# Patient Record
Sex: MALE | Race: WHITE
[De-identification: names, ages, dates, MRNs, and addresses within clinical notes are randomized per-mention and may not be internally consistent; named-entity substitution may affect disease eponyms.]

---

## 2022-02-18 ENCOUNTER — HOSPITAL ENCOUNTER (INPATIENT)
Dept: HOSPITAL 95 - ER | Age: 57
LOS: 2 days | Discharge: HOME | DRG: 247 | End: 2022-02-20
Attending: INTERNAL MEDICINE | Admitting: INTERNAL MEDICINE
Payer: COMMERCIAL

## 2022-02-18 VITALS — WEIGHT: 205.47 LBS | BODY MASS INDEX: 31.14 KG/M2 | HEIGHT: 68 IN

## 2022-02-18 DIAGNOSIS — E78.5: ICD-10-CM

## 2022-02-18 DIAGNOSIS — I35.0: ICD-10-CM

## 2022-02-18 DIAGNOSIS — Z88.0: ICD-10-CM

## 2022-02-18 DIAGNOSIS — E11.9: ICD-10-CM

## 2022-02-18 DIAGNOSIS — I25.2: ICD-10-CM

## 2022-02-18 DIAGNOSIS — I25.10: ICD-10-CM

## 2022-02-18 DIAGNOSIS — Z88.6: ICD-10-CM

## 2022-02-18 DIAGNOSIS — Z53.29: ICD-10-CM

## 2022-02-18 DIAGNOSIS — L40.59: ICD-10-CM

## 2022-02-18 DIAGNOSIS — I16.1: ICD-10-CM

## 2022-02-18 DIAGNOSIS — J44.9: ICD-10-CM

## 2022-02-18 DIAGNOSIS — Z98.890: ICD-10-CM

## 2022-02-18 DIAGNOSIS — Z95.5: ICD-10-CM

## 2022-02-18 DIAGNOSIS — Z88.5: ICD-10-CM

## 2022-02-18 DIAGNOSIS — I21.4: Primary | ICD-10-CM

## 2022-02-18 DIAGNOSIS — Z79.4: ICD-10-CM

## 2022-02-18 DIAGNOSIS — E66.9: ICD-10-CM

## 2022-02-18 DIAGNOSIS — Z20.822: ICD-10-CM

## 2022-02-18 DIAGNOSIS — L40.50: ICD-10-CM

## 2022-02-18 LAB
ALBUMIN SERPL BCP-MCNC: 3.5 G/DL (ref 3.4–5)
ALBUMIN/GLOB SERPL: 0.8 {RATIO} (ref 0.8–1.8)
ALT SERPL W P-5'-P-CCNC: 25 U/L (ref 12–78)
ANION GAP SERPL CALCULATED.4IONS-SCNC: 9 MMOL/L (ref 6–16)
AST SERPL W P-5'-P-CCNC: 18 U/L (ref 12–37)
BASOPHILS # BLD AUTO: 0.07 K/MM3 (ref 0–0.23)
BASOPHILS NFR BLD AUTO: 1 % (ref 0–2)
BILIRUB SERPL-MCNC: 0.5 MG/DL (ref 0.1–1)
BUN SERPL-MCNC: 17 MG/DL (ref 8–24)
CALCIUM SERPL-MCNC: 9 MG/DL (ref 8.5–10.1)
CHLORIDE SERPL-SCNC: 101 MMOL/L (ref 98–108)
CHOLEST SERPL-MCNC: 262 MG/DL (ref 50–200)
CHOLEST/HDLC SERPL: 6.4 {RATIO}
CO2 SERPL-SCNC: 23 MMOL/L (ref 21–32)
CREAT SERPL-MCNC: 0.58 MG/DL (ref 0.6–1.2)
DEPRECATED RDW RBC AUTO: 43.7 FL (ref 35.1–46.3)
EOSINOPHIL # BLD AUTO: 0.12 K/MM3 (ref 0–0.68)
EOSINOPHIL NFR BLD AUTO: 1 % (ref 0–6)
ERYTHROCYTE [DISTWIDTH] IN BLOOD BY AUTOMATED COUNT: 12.8 % (ref 11.7–14.2)
GLOBULIN SER CALC-MCNC: 4.6 G/DL (ref 2.2–4)
GLUCOSE SERPL-MCNC: 269 MG/DL (ref 70–99)
HCT VFR BLD AUTO: 53.8 % (ref 37–53)
HDLC SERPL-MCNC: 41 MG/DL (ref 39–?)
HGB BLD-MCNC: 18.4 G/DL (ref 13.5–17.5)
IMM GRANULOCYTES # BLD AUTO: 0.07 K/MM3 (ref 0–0.1)
IMM GRANULOCYTES NFR BLD AUTO: 1 % (ref 0–1)
LDLC SERPL CALC-MCNC: 179 MG/DL (ref 0–110)
LDLC/HDLC SERPL: 4.4 {RATIO}
LYMPHOCYTES # BLD AUTO: 2.67 K/MM3 (ref 0.84–5.2)
LYMPHOCYTES NFR BLD AUTO: 23 % (ref 21–46)
MCHC RBC AUTO-ENTMCNC: 34.2 G/DL (ref 31.5–36.5)
MCV RBC AUTO: 92 FL (ref 80–100)
MONOCYTES # BLD AUTO: 0.56 K/MM3 (ref 0.16–1.47)
MONOCYTES NFR BLD AUTO: 5 % (ref 4–13)
NEUTROPHILS # BLD AUTO: 7.98 K/MM3 (ref 1.96–9.15)
NEUTROPHILS NFR BLD AUTO: 70 % (ref 41–73)
NRBC # BLD AUTO: 0 K/MM3 (ref 0–0.02)
NRBC BLD AUTO-RTO: 0 /100 WBC (ref 0–0.2)
PLATELET # BLD AUTO: 202 K/MM3 (ref 150–400)
POTASSIUM SERPL-SCNC: 3.8 MMOL/L (ref 3.5–5.5)
PROT SERPL-MCNC: 8.1 G/DL (ref 6.4–8.2)
PROTHROMBIN TIME: 10.7 SEC (ref 9.7–11.5)
SODIUM SERPL-SCNC: 133 MMOL/L (ref 136–145)
TRIGL SERPL-MCNC: 211 MG/DL (ref 30–160)
UFH PPP CHRO-ACNC: <0.1 IU/ML
VLDLC SERPL CALC-MCNC: 42 MG/DL (ref 6–32)

## 2022-02-18 PROCEDURE — C1769 GUIDE WIRE: HCPCS

## 2022-02-18 PROCEDURE — A9270 NON-COVERED ITEM OR SERVICE: HCPCS

## 2022-02-18 PROCEDURE — C1887 CATHETER, GUIDING: HCPCS

## 2022-02-18 PROCEDURE — C1760 CLOSURE DEV, VASC: HCPCS

## 2022-02-18 PROCEDURE — C9600 PERC DRUG-EL COR STENT SING: HCPCS

## 2022-02-18 PROCEDURE — C1874 STENT, COATED/COV W/DEL SYS: HCPCS

## 2022-02-18 PROCEDURE — C1725 CATH, TRANSLUMIN NON-LASER: HCPCS

## 2022-02-18 PROCEDURE — C9601 PERC DRUG-EL COR STENT BRAN: HCPCS

## 2022-02-18 NOTE — NUR
TRANSFER UPDATE
ASSUMED CARE OF PT AT 1735. REPORT  RECIEVED FROM ER NURSE AT 1720. PT ARRIVED
TO UNIT VIA GURNEY AND ON RA. PT ABLE TO TRANSFER SELF FROM GURNEY TO PCU BED.
PT REPORTS HAVING ACTIVE CHEST DISCOMFORT, 8/10. HEPARIN RUNNING PER EMAR. NS
RUNNING PER EMAR.

## 2022-02-19 LAB
ANION GAP SERPL CALCULATED.4IONS-SCNC: 8 MMOL/L (ref 6–16)
BASOPHILS # BLD AUTO: 0.05 K/MM3 (ref 0–0.23)
BASOPHILS NFR BLD AUTO: 0 % (ref 0–2)
BUN SERPL-MCNC: 17 MG/DL (ref 8–24)
CALCIUM SERPL-MCNC: 8.8 MG/DL (ref 8.5–10.1)
CHLORIDE SERPL-SCNC: 104 MMOL/L (ref 98–108)
CO2 SERPL-SCNC: 23 MMOL/L (ref 21–32)
CREAT SERPL-MCNC: 0.73 MG/DL (ref 0.6–1.2)
DEPRECATED RDW RBC AUTO: 43.3 FL (ref 35.1–46.3)
EOSINOPHIL # BLD AUTO: 0.14 K/MM3 (ref 0–0.68)
EOSINOPHIL NFR BLD AUTO: 1 % (ref 0–6)
ERYTHROCYTE [DISTWIDTH] IN BLOOD BY AUTOMATED COUNT: 12.8 % (ref 11.7–14.2)
FLUAV RNA SPEC QL NAA+PROBE: NEGATIVE
FLUBV RNA SPEC QL NAA+PROBE: NEGATIVE
GLUCOSE SERPL-MCNC: 283 MG/DL (ref 70–99)
HCT VFR BLD AUTO: 48 % (ref 37–53)
HGB BLD-MCNC: 16.3 G/DL (ref 13.5–17.5)
IMM GRANULOCYTES # BLD AUTO: 0.06 K/MM3 (ref 0–0.1)
IMM GRANULOCYTES NFR BLD AUTO: 1 % (ref 0–1)
LYMPHOCYTES # BLD AUTO: 2.74 K/MM3 (ref 0.84–5.2)
LYMPHOCYTES NFR BLD AUTO: 24 % (ref 21–46)
MCHC RBC AUTO-ENTMCNC: 34 G/DL (ref 31.5–36.5)
MCV RBC AUTO: 92 FL (ref 80–100)
MONOCYTES # BLD AUTO: 0.81 K/MM3 (ref 0.16–1.47)
MONOCYTES NFR BLD AUTO: 7 % (ref 4–13)
NEUTROPHILS # BLD AUTO: 7.66 K/MM3 (ref 1.96–9.15)
NEUTROPHILS NFR BLD AUTO: 67 % (ref 41–73)
NRBC # BLD AUTO: 0 K/MM3 (ref 0–0.02)
NRBC BLD AUTO-RTO: 0 /100 WBC (ref 0–0.2)
PLATELET # BLD AUTO: 199 K/MM3 (ref 150–400)
POTASSIUM SERPL-SCNC: 3.7 MMOL/L (ref 3.5–5.5)
RSV RNA SPEC QL NAA+PROBE: NEGATIVE
SARS-COV-2 RNA RESP QL NAA+PROBE: NEGATIVE
SODIUM SERPL-SCNC: 135 MMOL/L (ref 136–145)

## 2022-02-19 PROCEDURE — B2111ZZ FLUOROSCOPY OF MULTIPLE CORONARY ARTERIES USING LOW OSMOLAR CONTRAST: ICD-10-PCS | Performed by: INTERNAL MEDICINE

## 2022-02-19 PROCEDURE — 027034Z DILATION OF CORONARY ARTERY, ONE ARTERY WITH DRUG-ELUTING INTRALUMINAL DEVICE, PERCUTANEOUS APPROACH: ICD-10-PCS | Performed by: INTERNAL MEDICINE

## 2022-02-19 NOTE — NUR
CRITICAL VALUE: RECEIVED CALL FROM [facility] AT 0027 PATIENT'S TROPONIN
WAS 3523 AT 2315 DRAW. SPOKE WITH SHANICE JACKSON AND CALLED HOSPITALIST.
HOSPITALIST CALLED BACK AT 0115 AND STATED NO NEW ORDERS.

## 2022-02-19 NOTE — NUR
SHIFT SUMMARY: PATIENT HYPERTENSIVE BPS AND TACHYCARDIC EARLY IN SHIFT -
MEDICATED PER EMAR. BPS CAME DOWN TO 140S SYSTOLIC. O2 WNL - PATIENT IS DAILY
SMOKER. TROPONIN WAS 3523 - SEE PREVIOUS NOTE. PLAN IS FOR PATIENT TO HAVE AN
ANGIO 2/19 AND HAS BEEN NPO SINCE MIDNIGHT. HEPARIN DRIP WAS CHANGED FROM 15
TO 17 U/KG/HR AT 0235; NS ALSO RUNNING PER EMAR. PATIENT'S A1C IN 16S; GLUCOSE
. PATIENT STATES NORMAL TO BE IN 300S AND DOES NOT TAKE INSULIN AT
HOME. CHARGE RN MANUEL DISCUSSED HIS A1C WITH HIM AND HE WAS SURPRISED HOW
HIGH IT WAS. SPOUSE IS RN AT [facility]. PATIENT PLEASANT AND COOPERATIVE WITH CARE.
AMBULATES TO TOILET WITH SUPERVISION. WILL CONTINUE TO MONITOR AND REPORT TO
DAY RN.

## 2022-02-19 NOTE — NUR
END OF SHIFT:
     PATIENT IS S/P STENT X 2. R GROIN SITE. THE SITE RECOVERED WELL, WITH
ONLY A LITTLE BIT OF OOZING THAT REQUIRED A DRESSING CHANGE. ONCE MANUAL
PRESSURE AND DRESSING CHANGE, NO INCREASE IN OOZING. PATIENT'S CHEST PAIN
RESOLVED SINCE ANGIO. PATIENT HAS HAD INCREASED CBG'S HOWEVER, SLIDING
COVERAGE HAS BEEN INCREASED FOR THE PM DOSAGING. PATIENT HAS NOT RECIEVED ANY
PRN HTN MEDICATIONS. STILL ON TELE SR 80-90'S. DENIES SOB. PATIENT NOT
AMBULATED SINCE ANGIO DUE TO THE OCCASSIONAL OOZING INCREASE WITH THE BASIC
INCREASES IN BED DEGREES. PATIENT RECIEVED LOADING DOSE OF PLAVIX AND ASPIRIN.
PEDAL PULSES WERE ALREADY +1 STIL THE SAME. PATIENT HAS NO QUESTIONS OR
CONCERNS. WILL CONTINUE TO MONITOR.

## 2022-02-20 LAB
ANION GAP SERPL CALCULATED.4IONS-SCNC: 7 MMOL/L (ref 6–16)
BASOPHILS # BLD AUTO: 0.06 K/MM3 (ref 0–0.23)
BASOPHILS NFR BLD AUTO: 1 % (ref 0–2)
BUN SERPL-MCNC: 14 MG/DL (ref 8–24)
CALCIUM SERPL-MCNC: 8.4 MG/DL (ref 8.5–10.1)
CHLORIDE SERPL-SCNC: 105 MMOL/L (ref 98–108)
CO2 SERPL-SCNC: 22 MMOL/L (ref 21–32)
CREAT SERPL-MCNC: 0.75 MG/DL (ref 0.6–1.2)
DEPRECATED RDW RBC AUTO: 44.6 FL (ref 35.1–46.3)
EOSINOPHIL # BLD AUTO: 0.15 K/MM3 (ref 0–0.68)
EOSINOPHIL NFR BLD AUTO: 1 % (ref 0–6)
ERYTHROCYTE [DISTWIDTH] IN BLOOD BY AUTOMATED COUNT: 13.2 % (ref 11.7–14.2)
GLUCOSE SERPL-MCNC: 262 MG/DL (ref 70–99)
HCT VFR BLD AUTO: 46.3 % (ref 37–53)
HGB BLD-MCNC: 15.7 G/DL (ref 13.5–17.5)
IMM GRANULOCYTES # BLD AUTO: 0.09 K/MM3 (ref 0–0.1)
IMM GRANULOCYTES NFR BLD AUTO: 1 % (ref 0–1)
LYMPHOCYTES # BLD AUTO: 2.03 K/MM3 (ref 0.84–5.2)
LYMPHOCYTES NFR BLD AUTO: 17 % (ref 21–46)
MCHC RBC AUTO-ENTMCNC: 33.9 G/DL (ref 31.5–36.5)
MCV RBC AUTO: 93 FL (ref 80–100)
MONOCYTES # BLD AUTO: 1.11 K/MM3 (ref 0.16–1.47)
MONOCYTES NFR BLD AUTO: 9 % (ref 4–13)
NEUTROPHILS # BLD AUTO: 8.72 K/MM3 (ref 1.96–9.15)
NEUTROPHILS NFR BLD AUTO: 72 % (ref 41–73)
NRBC # BLD AUTO: 0 K/MM3 (ref 0–0.02)
NRBC BLD AUTO-RTO: 0 /100 WBC (ref 0–0.2)
PLATELET # BLD AUTO: 184 K/MM3 (ref 150–400)
POTASSIUM SERPL-SCNC: 3.8 MMOL/L (ref 3.5–5.5)
SODIUM SERPL-SCNC: 134 MMOL/L (ref 136–145)

## 2022-02-20 NOTE — NUR
DISCHARGE SUMMARY:
PATIENT DISCHARGE VIA WHEELCHAIR, PATIENT DENIES CHEST PAIN, SOB. STENT
CARDS PLACED IN DC FOLDER, COMPLETE UNDERSTANDING OF DISCHARGE
INSTRUCTIONS, THOUROUGHLY EDUCATED ON MEDICATION ADHERENCE, PATIENT HAD
CLEAR AND COMPLETE UNDERSTANDING. PLAVIX ASA AGREEMENT, GROIN SITE
AGREEMENT SIGNED. PATIENT ON RA AND SR UPPER 90'S AT DISCHARGE BEFORE IV
AND TELE DC'D. PATIENT HAD NO FURTHER QUESTIONS OR CONCERNS. PATIENT TO
 MEDICATIONS TOMORROW, PLAVIX AND ASA GIVEN THIS AM.

## 2022-02-20 NOTE — NUR
SHIFT SUMMARY: PATIENT'S SYSTOLIC BP <160 T/O SHIFT AND HR <100, OTHER VS WNL.
PATIENT'S ANGIO SITE WNL. PATIENT DENIES CHEST PAIN AND SOB. PATIENT IS
ANXIOUS TO GO HOME TODAY AND SLEEP IN HIS OWN BED. PLEASANT AND COOPERATIVE
WITH CARE. NO ADVERSE EVENTS THIS SHIFT. WILL CONTINUE TO MONITOR AND REPORT
TO ONCOMING RN.

## 2022-02-20 NOTE — NUR
CARE ASSUMPTION: RECEIVED REPORT FROM SYDNEY RN AT 1850. PATIENT RECEIVED RIGHT
GROIN ANGIO WITH 2 STENTS AT 1030 AND RETURNED TO UNIT AT 1206. VSS, GROIN
SITE HAD OOZED PRIOR IN SHIFT AND DRESSING WAS CHANGED AT THAT TIME. THIS
RN WITH OFF-GOING RN ASSESSED SITE AT SHIFT CHANGE - SITE WNL. PATIENT DENIED
PAIN AT SITE, CHEST PAIN, AND SOB. DISTAL PULSES PALPABLE. PATIENT STATED HE
FEELS A LOT BETTER AND IS LOOKING FORWARD TO GOING HOME TOMORROW.

## 2022-03-01 ENCOUNTER — HOSPITAL ENCOUNTER (EMERGENCY)
Dept: HOSPITAL 95 - ER | Age: 57
Discharge: HOME | End: 2022-03-01
Payer: COMMERCIAL

## 2022-03-01 VITALS — BODY MASS INDEX: 31.22 KG/M2 | HEIGHT: 68 IN | WEIGHT: 206 LBS

## 2022-03-01 DIAGNOSIS — I25.2: ICD-10-CM

## 2022-03-01 DIAGNOSIS — Z79.899: ICD-10-CM

## 2022-03-01 DIAGNOSIS — L03.314: Primary | ICD-10-CM

## 2022-03-01 DIAGNOSIS — E78.5: ICD-10-CM

## 2022-03-01 DIAGNOSIS — J44.9: ICD-10-CM

## 2022-03-01 DIAGNOSIS — Z88.5: ICD-10-CM

## 2022-03-01 DIAGNOSIS — Z88.0: ICD-10-CM

## 2022-03-01 DIAGNOSIS — F17.210: ICD-10-CM

## 2022-03-01 DIAGNOSIS — E11.9: ICD-10-CM

## 2022-03-01 LAB
ALBUMIN SERPL BCP-MCNC: 2.4 G/DL (ref 3.4–5)
ALBUMIN/GLOB SERPL: 0.5 {RATIO} (ref 0.8–1.8)
ALT SERPL W P-5'-P-CCNC: 19 U/L (ref 12–78)
ANION GAP SERPL CALCULATED.4IONS-SCNC: 10 MMOL/L (ref 6–16)
AST SERPL W P-5'-P-CCNC: 17 U/L (ref 12–37)
BASOPHILS # BLD: 0 K/MM3 (ref 0–0.23)
BASOPHILS NFR BLD: 0 % (ref 0–2)
BILIRUB SERPL-MCNC: 0.4 MG/DL (ref 0.1–1)
BUN SERPL-MCNC: 16 MG/DL (ref 8–24)
CALCIUM SERPL-MCNC: 8.9 MG/DL (ref 8.5–10.1)
CHLORIDE SERPL-SCNC: 99 MMOL/L (ref 98–108)
CO2 SERPL-SCNC: 22 MMOL/L (ref 21–32)
CREAT SERPL-MCNC: 0.69 MG/DL (ref 0.6–1.2)
DEPRECATED RDW RBC AUTO: 43.6 FL (ref 35.1–46.3)
EOSINOPHIL # BLD: 0.22 K/MM3 (ref 0–0.68)
EOSINOPHIL NFR BLD: 1 % (ref 0–6)
ERYTHROCYTE [DISTWIDTH] IN BLOOD BY AUTOMATED COUNT: 12.8 % (ref 11.7–14.2)
GLOBULIN SER CALC-MCNC: 4.7 G/DL (ref 2.2–4)
GLUCOSE SERPL-MCNC: 310 MG/DL (ref 70–99)
HCT VFR BLD AUTO: 38.2 % (ref 37–53)
HGB BLD-MCNC: 13.3 G/DL (ref 13.5–17.5)
LYMPHOCYTES # BLD: 2.91 K/MM3 (ref 0.84–5.2)
LYMPHOCYTES NFR BLD: 13 % (ref 21–46)
MCHC RBC AUTO-ENTMCNC: 34.8 G/DL (ref 31.5–36.5)
MCV RBC AUTO: 92 FL (ref 80–100)
MONOCYTES # BLD: 2.69 K/MM3 (ref 0.16–1.47)
MONOCYTES NFR BLD: 12 % (ref 4–13)
NEUTS BAND NFR BLD MANUAL: 2 % (ref 0–8)
NEUTS SEG # BLD MANUAL: 16.61 K/MM3 (ref 1.96–9.15)
NEUTS SEG NFR BLD MANUAL: 72 % (ref 41–73)
NRBC # BLD AUTO: 0 K/MM3 (ref 0–0.02)
NRBC BLD AUTO-RTO: 0 /100 WBC (ref 0–0.2)
PLATELET # BLD AUTO: 242 K/MM3 (ref 150–400)
POTASSIUM SERPL-SCNC: 3.9 MMOL/L (ref 3.5–5.5)
PROT SERPL-MCNC: 7.1 G/DL (ref 6.4–8.2)
SODIUM SERPL-SCNC: 131 MMOL/L (ref 136–145)
TOTAL CELLS COUNTED BLD: 100

## 2022-05-06 ENCOUNTER — HOSPITAL ENCOUNTER (OUTPATIENT)
Dept: HOSPITAL 95 - WOUND | Age: 57
Discharge: HOME | End: 2022-05-06
Attending: SURGERY
Payer: COMMERCIAL

## 2022-05-06 DIAGNOSIS — Z88.5: ICD-10-CM

## 2022-05-06 DIAGNOSIS — I87.2: ICD-10-CM

## 2022-05-06 DIAGNOSIS — F17.210: ICD-10-CM

## 2022-05-06 DIAGNOSIS — L08.9: Primary | ICD-10-CM

## 2022-05-06 DIAGNOSIS — I73.9: ICD-10-CM

## 2022-05-06 DIAGNOSIS — L03.115: ICD-10-CM

## 2022-05-06 DIAGNOSIS — Z88.8: ICD-10-CM

## 2022-05-06 DIAGNOSIS — I72.4: ICD-10-CM

## 2022-05-06 DIAGNOSIS — T88.8XXD: ICD-10-CM

## 2022-05-06 DIAGNOSIS — Z88.6: ICD-10-CM

## 2022-05-06 DIAGNOSIS — T82.837D: ICD-10-CM

## 2022-05-06 PROCEDURE — A9270 NON-COVERED ITEM OR SERVICE: HCPCS

## 2022-05-06 PROCEDURE — G0463 HOSPITAL OUTPT CLINIC VISIT: HCPCS

## 2022-05-13 ENCOUNTER — HOSPITAL ENCOUNTER (OUTPATIENT)
Dept: HOSPITAL 95 - WOUND | Age: 57
Discharge: HOME | End: 2022-05-13
Attending: SURGERY
Payer: COMMERCIAL

## 2022-05-13 DIAGNOSIS — I73.9: ICD-10-CM

## 2022-05-13 DIAGNOSIS — L03.115: ICD-10-CM

## 2022-05-13 DIAGNOSIS — T88.8XXD: ICD-10-CM

## 2022-05-13 DIAGNOSIS — I87.2: ICD-10-CM

## 2022-05-13 DIAGNOSIS — L08.9: ICD-10-CM

## 2022-05-13 DIAGNOSIS — T82.837D: ICD-10-CM

## 2022-05-13 DIAGNOSIS — I72.4: ICD-10-CM

## 2022-05-13 DIAGNOSIS — E11.622: Primary | ICD-10-CM

## 2022-05-13 DIAGNOSIS — L98.492: ICD-10-CM

## 2022-05-13 PROCEDURE — A9270 NON-COVERED ITEM OR SERVICE: HCPCS

## 2022-05-13 PROCEDURE — G0463 HOSPITAL OUTPT CLINIC VISIT: HCPCS

## 2022-05-20 ENCOUNTER — HOSPITAL ENCOUNTER (OUTPATIENT)
Dept: HOSPITAL 95 - WOUND | Age: 57
Discharge: HOME | End: 2022-05-20
Attending: SURGERY
Payer: COMMERCIAL

## 2022-05-20 DIAGNOSIS — I25.2: ICD-10-CM

## 2022-05-20 DIAGNOSIS — L03.115: ICD-10-CM

## 2022-05-20 DIAGNOSIS — L08.9: ICD-10-CM

## 2022-05-20 DIAGNOSIS — I72.4: ICD-10-CM

## 2022-05-20 DIAGNOSIS — L98.492: ICD-10-CM

## 2022-05-20 DIAGNOSIS — I87.2: ICD-10-CM

## 2022-05-20 DIAGNOSIS — E11.51: ICD-10-CM

## 2022-05-20 DIAGNOSIS — T82.837D: ICD-10-CM

## 2022-05-20 DIAGNOSIS — T88.8XXD: ICD-10-CM

## 2022-05-20 DIAGNOSIS — M19.90: ICD-10-CM

## 2022-05-20 DIAGNOSIS — E11.622: Primary | ICD-10-CM

## 2022-05-20 DIAGNOSIS — I10: ICD-10-CM

## 2022-05-20 PROCEDURE — A9270 NON-COVERED ITEM OR SERVICE: HCPCS

## 2022-05-20 PROCEDURE — G0463 HOSPITAL OUTPT CLINIC VISIT: HCPCS

## 2022-05-27 ENCOUNTER — HOSPITAL ENCOUNTER (OUTPATIENT)
Dept: HOSPITAL 95 - WOUND | Age: 57
Discharge: HOME | End: 2022-05-27
Attending: SURGERY
Payer: COMMERCIAL

## 2022-05-27 DIAGNOSIS — E11.622: Primary | ICD-10-CM

## 2022-05-27 DIAGNOSIS — S51.009D: ICD-10-CM

## 2022-05-27 DIAGNOSIS — T82.837D: ICD-10-CM
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
## 2022-05-27 DIAGNOSIS — I72.4: ICD-10-CM

## 2022-05-27 DIAGNOSIS — T88.8XXD: ICD-10-CM

## 2022-05-27 DIAGNOSIS — L08.9: ICD-10-CM

## 2022-05-27 DIAGNOSIS — L97.112: ICD-10-CM

## 2022-05-27 DIAGNOSIS — E11.51: ICD-10-CM

## 2022-05-27 PROCEDURE — G0463 HOSPITAL OUTPT CLINIC VISIT: HCPCS

## 2022-05-27 PROCEDURE — A9270 NON-COVERED ITEM OR SERVICE: HCPCS

## 2022-06-03 ENCOUNTER — HOSPITAL ENCOUNTER (OUTPATIENT)
Dept: HOSPITAL 95 - WOUND | Age: 57
Discharge: HOME | End: 2022-06-03
Attending: SURGERY
Payer: COMMERCIAL

## 2022-06-03 DIAGNOSIS — L08.9: ICD-10-CM
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
## 2022-06-03 DIAGNOSIS — I10: ICD-10-CM

## 2022-06-03 DIAGNOSIS — S51.009D: ICD-10-CM

## 2022-06-03 DIAGNOSIS — E11.622: Primary | ICD-10-CM

## 2022-06-03 DIAGNOSIS — E11.51: ICD-10-CM

## 2022-06-03 DIAGNOSIS — L98.492: ICD-10-CM

## 2022-06-03 DIAGNOSIS — T82.837D: ICD-10-CM

## 2022-06-03 DIAGNOSIS — T81.41XA: ICD-10-CM

## 2022-06-03 DIAGNOSIS — I25.2: ICD-10-CM

## 2022-06-03 PROCEDURE — A9270 NON-COVERED ITEM OR SERVICE: HCPCS

## 2022-06-03 PROCEDURE — G0463 HOSPITAL OUTPT CLINIC VISIT: HCPCS

## 2022-06-10 ENCOUNTER — HOSPITAL ENCOUNTER (OUTPATIENT)
Dept: HOSPITAL 95 - WOUND | Age: 57
Discharge: HOME | End: 2022-06-10
Attending: SURGERY
Payer: COMMERCIAL
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
## 2022-06-10 DIAGNOSIS — T88.8XXD: ICD-10-CM

## 2022-06-10 DIAGNOSIS — I72.4: Primary | ICD-10-CM

## 2022-06-10 DIAGNOSIS — T82.837D: ICD-10-CM

## 2022-06-10 DIAGNOSIS — S51.009D: ICD-10-CM

## 2022-06-10 DIAGNOSIS — I73.9: ICD-10-CM

## 2022-06-10 DIAGNOSIS — L08.9: ICD-10-CM

## 2022-06-10 PROCEDURE — G0463 HOSPITAL OUTPT CLINIC VISIT: HCPCS

## 2022-06-17 ENCOUNTER — HOSPITAL ENCOUNTER (OUTPATIENT)
Dept: HOSPITAL 95 - WOUND | Age: 57
Discharge: HOME | End: 2022-06-17
Attending: SURGERY
Payer: COMMERCIAL

## 2022-06-17 DIAGNOSIS — L08.9: ICD-10-CM

## 2022-06-17 DIAGNOSIS — I72.4: ICD-10-CM

## 2022-06-17 DIAGNOSIS — E11.51: ICD-10-CM

## 2022-06-17 DIAGNOSIS — E11.622: Primary | ICD-10-CM

## 2022-06-17 DIAGNOSIS — T88.8XXD: ICD-10-CM

## 2022-06-17 DIAGNOSIS — I10: ICD-10-CM

## 2022-06-17 DIAGNOSIS — T82.837D: ICD-10-CM
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
## 2022-06-17 DIAGNOSIS — L98.492: ICD-10-CM

## 2022-06-17 DIAGNOSIS — S51.009D: ICD-10-CM

## 2022-06-17 PROCEDURE — G0463 HOSPITAL OUTPT CLINIC VISIT: HCPCS

## 2022-06-17 PROCEDURE — A9270 NON-COVERED ITEM OR SERVICE: HCPCS

## 2022-07-06 ENCOUNTER — HOSPITAL ENCOUNTER (OUTPATIENT)
Dept: HOSPITAL 95 - WOUND | Age: 57
Discharge: HOME | End: 2022-07-06
Attending: SURGERY
Payer: COMMERCIAL

## 2022-07-06 DIAGNOSIS — S51.009D: ICD-10-CM

## 2022-07-06 DIAGNOSIS — Z86.31: ICD-10-CM

## 2022-07-06 DIAGNOSIS — T88.6XXD: ICD-10-CM

## 2022-07-06 DIAGNOSIS — I25.2: ICD-10-CM

## 2022-07-06 DIAGNOSIS — I10: ICD-10-CM

## 2022-07-06 DIAGNOSIS — T82.837D: ICD-10-CM

## 2022-07-06 DIAGNOSIS — Z09: Primary | ICD-10-CM

## 2022-07-06 DIAGNOSIS — M19.90: ICD-10-CM

## 2022-07-06 DIAGNOSIS — L08.9: ICD-10-CM

## 2022-07-06 DIAGNOSIS — I72.4: ICD-10-CM

## 2022-07-06 DIAGNOSIS — E11.51: ICD-10-CM

## 2022-07-06 PROCEDURE — G0463 HOSPITAL OUTPT CLINIC VISIT: HCPCS

## 2022-07-25 ENCOUNTER — HOSPITAL ENCOUNTER (OUTPATIENT)
Dept: HOSPITAL 95 - WOUND | Age: 57
Discharge: HOME | End: 2022-07-25
Attending: SURGERY
Payer: COMMERCIAL

## 2022-07-25 DIAGNOSIS — I72.4: ICD-10-CM
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
## 2022-07-25 DIAGNOSIS — E11.51: ICD-10-CM

## 2022-07-25 DIAGNOSIS — L08.9: ICD-10-CM

## 2022-07-25 DIAGNOSIS — T81.89XA: Primary | ICD-10-CM

## 2022-07-25 DIAGNOSIS — T82.837D: ICD-10-CM

## 2022-07-25 DIAGNOSIS — L98.492: ICD-10-CM

## 2022-07-25 DIAGNOSIS — E11.622: ICD-10-CM

## 2022-07-25 PROCEDURE — G0463 HOSPITAL OUTPT CLINIC VISIT: HCPCS

## 2022-08-08 ENCOUNTER — HOSPITAL ENCOUNTER (OUTPATIENT)
Dept: HOSPITAL 95 - WOUND | Age: 57
Discharge: HOME | End: 2022-08-08
Attending: SURGERY
Payer: COMMERCIAL

## 2022-08-08 DIAGNOSIS — Z87.2: ICD-10-CM

## 2022-08-08 DIAGNOSIS — I72.4: ICD-10-CM

## 2022-08-08 DIAGNOSIS — E11.51: ICD-10-CM

## 2022-08-08 DIAGNOSIS — L08.9: ICD-10-CM

## 2022-08-08 DIAGNOSIS — Z09: Primary | ICD-10-CM

## 2022-08-08 PROCEDURE — G0463 HOSPITAL OUTPT CLINIC VISIT: HCPCS

## 2022-10-17 ENCOUNTER — HOSPITAL ENCOUNTER (OUTPATIENT)
Dept: HOSPITAL 95 - LAB | Age: 57
End: 2022-10-17
Attending: FAMILY MEDICINE
Payer: COMMERCIAL

## 2022-10-17 DIAGNOSIS — L03.115: Primary | ICD-10-CM

## 2022-10-17 LAB
BASOPHILS # BLD AUTO: 0.04 K/MM3 (ref 0–0.23)
BASOPHILS NFR BLD AUTO: 0 % (ref 0–2)
DEPRECATED RDW RBC AUTO: 52.1 FL (ref 35.1–46.3)
EOSINOPHIL # BLD AUTO: 0.03 K/MM3 (ref 0–0.68)
EOSINOPHIL NFR BLD AUTO: 0 % (ref 0–6)
ERYTHROCYTE [DISTWIDTH] IN BLOOD BY AUTOMATED COUNT: 15 % (ref 11.7–14.2)
HCT VFR BLD AUTO: 39.9 % (ref 37–53)
HGB BLD-MCNC: 13.9 G/DL (ref 13.5–17.5)
IMM GRANULOCYTES # BLD AUTO: 0.06 K/MM3 (ref 0–0.1)
IMM GRANULOCYTES NFR BLD AUTO: 1 % (ref 0–1)
LYMPHOCYTES # BLD AUTO: 1.54 K/MM3 (ref 0.84–5.2)
LYMPHOCYTES NFR BLD AUTO: 13 % (ref 21–46)
MCHC RBC AUTO-ENTMCNC: 34.8 G/DL (ref 31.5–36.5)
MCV RBC AUTO: 94 FL (ref 80–100)
MONOCYTES # BLD AUTO: 0.98 K/MM3 (ref 0.16–1.47)
MONOCYTES NFR BLD AUTO: 8 % (ref 4–13)
NEUTROPHILS # BLD AUTO: 9.32 K/MM3 (ref 1.96–9.15)
NEUTROPHILS NFR BLD AUTO: 78 % (ref 41–73)
NRBC # BLD AUTO: 0 K/MM3 (ref 0–0.02)
NRBC BLD AUTO-RTO: 0 /100 WBC (ref 0–0.2)
PLATELET # BLD AUTO: 180 K/MM3 (ref 150–400)